# Patient Record
(demographics unavailable — no encounter records)

---

## 2025-06-14 NOTE — ASSESSMENT
[FreeTextEntry1] : 8-year-old male here accompanied by his mother presents to be status post a left wrist injury.  Patient reports he fell on outstretched left hand and wrist last night.  Ports pain and swelling with distal radius since time of injury.  Physical examination left wrist: Mild swelling and tenderness over the distal radius.  No tenderness of the carpal bones or metacarpals.  Decreased range of motion.  Sensation is intact distally.  Neuro vas intact.  X-rays left wrist taken in the office reveal an acute closed nondisplaced buckle fracture left distal radius.  No subluxations or dislocations.  TX: X-rays were discussed in depth.  We discussed casting versus bracing.  Patient and mother opted for a brace over a cast at this time.  They understand the brace is suboptimal through the cast.  They understand the risks and benefits of being in a brace versus a cast.  Patient is to wear the brace at all times except when bathing.  Red flag symptoms were discussed.  Encouraged no gym or sports.  Encouraged very gentle range of motion.  Patient will be seen in 2 weeks for repeat x-rays and repeat evaluation/treatment. All questions and concerns addressed to patient's satisfaction. Patient expresses full understanding of treatment plan.

## 2025-06-19 NOTE — REASON FOR VISIT
[Initial Consultation] : an initial consultation for [ADHD] : ADHD [Other: ____] : [unfilled] [Patient] : patient [Mother] : mother [FreeTextEntry2] : frequent falls

## 2025-06-19 NOTE — ASSESSMENT
[FreeTextEntry1] : Jerel is an 7 yo old with hx of ASD and ADHD. No red flags for ASD but would not negate the previous diagnosis and continue with services. Lack of spatial awareness and clumsiness resulting in falls likely related to impulsivity and inattention as part of his ADHD. His neurological examination is non focal therefor I do not recommend imaging at this time. May benefit from medication management in the future for focus. In regard to behavior, suggestive of underlying anxiety - emotional dysregulation. May refer to developmental in the future if behaviors progress. Will evaluate upon return to school. Follow up in October.

## 2025-06-19 NOTE — HISTORY OF PRESENT ILLNESS
[FreeTextEntry1] : Jerel presents to office for concerns of recent falls. Jerel was diagnosed with ASD and ADHD years ago by a neurologist in [place] according to mom. He was evaluated by [facility] when he was an infant due to delayed developmental milestones. Walking around 15 months and talking around 4yo. He had an EEG and MRI which was reportedly normal. He received early intervention services including speech therapy and physical therapy. He is currently in 2nd grade. He is in an ICT type classroom and receives speech therapy, occupational therapy and SEITs. Academically, he is behind for his grade level. He struggles with reading comprehension and composing sentences. Teachers do not report any behavioral concerns to mom. He is a jonathan boy. He requires redirection at times and loses focus when he is not directly engaged.  At home, mom has concerns regarding his clumsiness as of late. For example, he fell down the stairs at his friend's house a month ago because he was excited to play. He fell at the park recently on the monkey bars and braced his fall with his left arm resulting in a left wrist injury requiring intervention. He slipped in the shower not too long ago. Mom notes that she did clean the bathroom that day. He can be impulsive, and he lacks spatial awareness. He is clumsy and may get hurt and cry.   Behaviorally, Mom has concerns he can be "overly emotional and dramatic." If he does not get what he wants he can get visibly upset and carry on. He is sensitive and nervous at times.  Socially, he interacts and plays with others. He has two best friends in school. He enjoys making new friends and playing at the park.

## 2025-07-01 NOTE — ASSESSMENT
[FreeTextEntry1] : immobilize in wrist brace for 3 weeks. Follow up in 4 months for physeal check.

## 2025-07-01 NOTE — DATA REVIEWED
[Acceptable Fracture Allignment] : fracture alignment remains acceptable [de-identified] :  3 views of left wrist reviewed.

## 2025-07-01 NOTE — DATA REVIEWED
[Acceptable Fracture Allignment] : fracture alignment remains acceptable [de-identified] :  3 views of left wrist reviewed.

## 2025-07-01 NOTE — HISTORY OF PRESENT ILLNESS
[FreeTextEntry1] : 9 y/o male here with left wrist buckle fracture after falling on an outstretched hand on 6/13/25.